# Patient Record
Sex: MALE | URBAN - METROPOLITAN AREA
[De-identification: names, ages, dates, MRNs, and addresses within clinical notes are randomized per-mention and may not be internally consistent; named-entity substitution may affect disease eponyms.]

---

## 2022-10-12 ENCOUNTER — HOSPITAL ENCOUNTER (OUTPATIENT)
Dept: LAB | Age: 42
Discharge: HOME | End: 2022-10-15

## 2022-10-12 PROCEDURE — 88312 SPECIAL STAINS GROUP 1: CPT

## 2022-10-12 PROCEDURE — 88305 TISSUE EXAM BY PATHOLOGIST: CPT

## 2023-03-10 NOTE — PROGRESS NOTES
New Patient Abstract    Reason for Referral: Low mean corpuscular volume    Referring Provider: Suzanne Elaine MD    Primary Care Provider: Unknown    Family History of Cancer/Hematologic Disorders: Family history is significant for father with prostate cancer. Presenting Symptoms: Globulus sensation and fecal leakage    Narrative with recent with Results/Procedures/Biopsies and Dates completed: Mr. Amisha Cartagena is a 45-year-old male with a history of anemia, GERD, dysphagia, ADHD, HTN, and adjustment disorder with anxiety. He was seen by Gastroenterologist, Dr. Tanner Harvey, at [address] [place] [address] on 1/19/23 after been referred by the [facility] for GI evaluation of anemia, GERD, dysphagia, rectal bleeding, and fecal leakage. He had been seen at [facility] previously on 9/29/22 and was recommended to begin a trial of Omeprazole 40 mg daily and daily fiber supplement. EGD and colonoscopy were scheduled, and labs were ordered. Labs drawn on 10/6/22 included vitamin B12, iron, folate, ferritin, CMP, and CBC. They were unremarkable except for RBC 5.97, MCV 72, and MCH 22.6. EGD with dilation and colonoscopy were performed on 10/12/22. EGD revealed findings of gastric erythema with biopsies showing fragments of gastric mucosa with acute and chronic gastritis in associates with organisms morphologically consistent with H. 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 Esophageal biopsies showed detached fragments of minimally hyperplastic squamous epithelium. Colonoscopy revealed findings of internal hemorrhoid and otherwise normal exam to T1 with good prep. Patient had not started treatment for H. Pylori, so he was started on 14-day course of clarithromycin based concomitant therapy. He had also not started daily fiber as recommended previously. He was advised to begin fiber supplementation, and if symptoms were to persist at that point hemorrhoidal banding would be considered.      Patient had been taking 325 mg of ferrous sulfate daily. CBC and iron panel were drawn on 1/19/23 to determine if continued supplementation was necessary. Labs were significant for RBC 6.16, MCH 23.2, MCV 74, and RDW 17.7. HGB and HCT were within normal limits at 14.3 and 45.4. Iron panel was within normal limits with iron 113 and iron saturation 39. Mr. Manisha Guy is now referred urgently to [facility], per GI, for hematology evaluation and treatment of low MCV. COLONOSCOPY REPORT 10/12/22        EGD REPORT 10/12/22        [facility] SURGICAL PATHOLOGY REPORT 10/12/22  DIAGNOSIS   A: DUODENAL BIOPSIES: FRAGMENTS OF HISTOLOGICALLY UNREMARKABLE SMALL INTESTINE.   B: GASTRIC BIOPSIES: FRAGMENTS OF GASTRIC MUCOSA WITH ACUTE AND CHRONIC GASTRITIS IN ASSOCIATION WITH ORGANISMS MORPHOLOGICALLY CONSISTENT WITH HELICOBACTER PYLORI. FOCAL GOBLET CELL TYPE INTESTINAL METAPLASIA. C: ESOPHAGEAL BIOPSIES: DETACHED FRAGMENTS OF MINIMALLY HYPERPLASTIC SQUAMOUS EPITHELIUM. D: RANDOM COLON BIOPSIES: FRAGMENTS OF HISTOLOGICALLY UNREMARKABLE LARGE INTESTINE.             Notes from Referring Provider: None    Other Pertinent Information: None    Presented at Tumor Board: N/A

## 2023-03-13 ENCOUNTER — HOSPITAL ENCOUNTER (OUTPATIENT)
Dept: LAB | Age: 43
Discharge: HOME | End: 2023-03-16
Payer: OTHER GOVERNMENT

## 2023-03-13 ENCOUNTER — OFFICE VISIT (OUTPATIENT)
Dept: ONCOLOGY | Age: 43
End: 2023-03-13
Payer: OTHER GOVERNMENT

## 2023-03-13 VITALS
OXYGEN SATURATION: 97 % | HEIGHT: 71 IN | BODY MASS INDEX: 25.73 KG/M2 | HEART RATE: 106 BPM | DIASTOLIC BLOOD PRESSURE: 89 MMHG | RESPIRATION RATE: 16 BRPM | SYSTOLIC BLOOD PRESSURE: 133 MMHG | TEMPERATURE: 98.3 F | WEIGHT: 183.8 LBS

## 2023-03-13 DIAGNOSIS — R71.8 MICROCYTOSIS: Primary | ICD-10-CM

## 2023-03-13 DIAGNOSIS — R71.8 MICROCYTOSIS: ICD-10-CM

## 2023-03-13 LAB
ALBUMIN SERPL-MCNC: 4.1 G/DL (ref 3.5–5)
ALBUMIN/GLOB SERPL: 1.1 (ref 0.4–1.6)
ALP SERPL-CCNC: 60 U/L (ref 50–136)
ALT SERPL-CCNC: 40 U/L (ref 12–65)
ANION GAP SERPL CALC-SCNC: 2 MMOL/L (ref 2–11)
AST SERPL-CCNC: 24 U/L (ref 15–37)
BASOPHILS # BLD: 0.1 K/UL (ref 0–0.2)
BASOPHILS NFR BLD: 1 % (ref 0–2)
BILIRUB SERPL-MCNC: 0.6 MG/DL (ref 0.2–1.1)
BUN SERPL-MCNC: 13 MG/DL (ref 6–23)
CALCIUM SERPL-MCNC: 9.4 MG/DL (ref 8.3–10.4)
CHLORIDE SERPL-SCNC: 106 MMOL/L (ref 101–110)
CO2 SERPL-SCNC: 30 MMOL/L (ref 21–32)
CREAT SERPL-MCNC: 1.2 MG/DL (ref 0.8–1.5)
DIFFERENTIAL METHOD BLD: ABNORMAL
EOSINOPHIL # BLD: 0.1 K/UL (ref 0–0.8)
EOSINOPHIL NFR BLD: 3 % (ref 0.5–7.8)
ERYTHROCYTE [DISTWIDTH] IN BLOOD BY AUTOMATED COUNT: 15 % (ref 11.9–14.6)
FERRITIN SERPL-MCNC: 224 NG/ML (ref 8–388)
GLOBULIN SER CALC-MCNC: 3.6 G/DL (ref 2.8–4.5)
GLUCOSE SERPL-MCNC: 86 MG/DL (ref 65–100)
HCT VFR BLD AUTO: 44.4 % (ref 41.1–50.3)
HGB BLD-MCNC: 13.8 G/DL (ref 13.6–17.2)
HGB RETIC QN AUTO: 26 PG (ref 29–35)
IMM GRANULOCYTES # BLD AUTO: 0 K/UL (ref 0–0.5)
IMM GRANULOCYTES NFR BLD AUTO: 0 % (ref 0–5)
IMM RETICS NFR: 10.9 % (ref 2.3–13.4)
IRON SATN MFR SERPL: 41 %
IRON SERPL-MCNC: 103 UG/DL (ref 35–150)
LYMPHOCYTES # BLD: 1 K/UL (ref 0.5–4.6)
LYMPHOCYTES NFR BLD: 24 % (ref 13–44)
MCH RBC QN AUTO: 23.1 PG (ref 26.1–32.9)
MCHC RBC AUTO-ENTMCNC: 31.1 G/DL (ref 31.4–35)
MCV RBC AUTO: 74.4 FL (ref 82–102)
MONOCYTES # BLD: 0.3 K/UL (ref 0.1–1.3)
MONOCYTES NFR BLD: 7 % (ref 4–12)
NEUTS SEG # BLD: 2.7 K/UL (ref 1.7–8.2)
NEUTS SEG NFR BLD: 65 % (ref 43–78)
NRBC # BLD: 0 K/UL (ref 0–0.2)
PLATELET # BLD AUTO: 263 K/UL (ref 150–450)
PMV BLD AUTO: 10 FL (ref 9.4–12.3)
POTASSIUM SERPL-SCNC: 4.6 MMOL/L (ref 3.5–5.1)
PROT SERPL-MCNC: 7.7 G/DL (ref 6.3–8.2)
RBC # BLD AUTO: 5.97 M/UL (ref 4.23–5.6)
RETICS # AUTO: 0.07 M/UL (ref 0.03–0.1)
RETICS/RBC NFR AUTO: 1.2 % (ref 0.3–2)
SODIUM SERPL-SCNC: 138 MMOL/L (ref 133–143)
TIBC SERPL-MCNC: 253 UG/DL (ref 250–450)
WBC # BLD AUTO: 4.2 K/UL (ref 4.3–11.1)

## 2023-03-13 PROCEDURE — 82728 ASSAY OF FERRITIN: CPT

## 2023-03-13 PROCEDURE — 80053 COMPREHEN METABOLIC PANEL: CPT

## 2023-03-13 PROCEDURE — 83020 HEMOGLOBIN ELECTROPHORESIS: CPT

## 2023-03-13 PROCEDURE — 83540 ASSAY OF IRON: CPT

## 2023-03-13 PROCEDURE — 99204 OFFICE O/P NEW MOD 45 MIN: CPT | Performed by: INTERNAL MEDICINE

## 2023-03-13 PROCEDURE — 84238 ASSAY NONENDOCRINE RECEPTOR: CPT

## 2023-03-13 PROCEDURE — 85025 COMPLETE CBC W/AUTO DIFF WBC: CPT

## 2023-03-13 PROCEDURE — 81257 HBA1/HBA2 GENE: CPT

## 2023-03-13 PROCEDURE — 36415 COLL VENOUS BLD VENIPUNCTURE: CPT

## 2023-03-13 PROCEDURE — 85046 RETICYTE/HGB CONCENTRATE: CPT

## 2023-03-13 RX ORDER — DEXTROAMPHETAMINE SACCHARATE, AMPHETAMINE ASPARTATE, DEXTROAMPHETAMINE SULFATE AND AMPHETAMINE SULFATE 7.5; 7.5; 7.5; 7.5 MG/1; MG/1; MG/1; MG/1
30 TABLET ORAL DAILY
COMMUNITY

## 2023-03-13 RX ORDER — ESCITALOPRAM OXALATE 10 MG/1
10 TABLET ORAL DAILY
COMMUNITY

## 2023-03-13 ASSESSMENT — PATIENT HEALTH QUESTIONNAIRE - PHQ9
SUM OF ALL RESPONSES TO PHQ QUESTIONS 1-9: 0
SUM OF ALL RESPONSES TO PHQ9 QUESTIONS 1 & 2: 0
2. FEELING DOWN, DEPRESSED OR HOPELESS: 0
1. LITTLE INTEREST OR PLEASURE IN DOING THINGS: 0
SUM OF ALL RESPONSES TO PHQ QUESTIONS 1-9: 0

## 2023-03-13 NOTE — PROGRESS NOTES
[address] [place] [address] Hematology and Oncology: Office Visit New Patient H & P    Chief Complaint:    Chief Complaint   Patient presents with    New Patient         History of Present Illness:  Mr. Juanjose Lay is a 38 y.o. male who presents today for evaluation regarding microcytosis. He was seen by Dr. Sarmad Valle at [address] [place] [address] on 1/19/23 after been referred by the [facility] for GI evaluation of anemia, GERD, dysphagia, rectal bleeding, and fecal leakage. He had been seen at [facility] previously on 9/29/22 and was recommended to begin a trial of Omeprazole 40 mg daily and daily fiber supplement. EGD and colonoscopy were scheduled, and labs were ordered. Labs drawn on 10/6/22 included vitamin B12, iron, folate, ferritin, CMP, and CBC. They were unremarkable except for an MCV of 72. EGD with dilation and colonoscopy were performed on 10/12/22. EGD revealed findings of gastric erythema with biopsies showing fragments of gastric mucosa with acute and chronic gastritis in associates with organisms morphologically consistent with H. 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 Esophageal biopsies showed detached fragments of minimally hyperplastic squamous epithelium. Colonoscopy revealed findings of internal hemorrhoid and otherwise normal exam.  CBC and iron panel were drawn on 1/19/23 to determine if continued iron supplementation was necessary. Labs were significant for RBC 6.16, MCH 23.2, MCV 74, and RDW 17.7. HGB and HCT were within normal limits at 14.3 and 45.4. Iron panel was within normal limits with iron 113 and iron saturation 39. Mr. Juanjose Lay is now referred to [facility], per GI, for hematology evaluation and treatment of low MCV. Other than fatigue, he has no new symptoms. Review of Systems:  Constitutional: Negative. HENT: Negative. Eyes: Negative. Respiratory: Negative. Cardiovascular: Negative. Gastrointestinal: Negative. Genitourinary: Negative. Musculoskeletal: Negative. Skin: Negative. Neurological: Negative. Endo/Heme/Allergies: Negative. Psychiatric/Behavioral: Negative. All other systems reviewed and are negative. No Known Allergies  No past medical history on file. No past surgical history on file. No family history on file. Social History     Socioeconomic History    Marital status:      Spouse name: Not on file    Number of children: Not on file    Years of education: Not on file    Highest education level: Not on file   Occupational History    Not on file   Tobacco Use    Smoking status: Every Day     Types: Cigarettes    Smokeless tobacco: Never    Tobacco comments:     Patient uses a vape. Substance and Sexual Activity    Alcohol use: Yes     Comment: Patient drinks but states that it doesnt happen often. Drug use: Not on file    Sexual activity: Not on file   Other Topics Concern    Not on file   Social History Narrative    Not on file     Social Determinants of Health     Financial Resource Strain: Not on file   Food Insecurity: Not on file   Transportation Needs: Not on file   Physical Activity: Not on file   Stress: Not on file   Social Connections: Not on file   Intimate Partner Violence: Not on file   Housing Stability: Not on file     Current Outpatient Medications   Medication Sig Dispense Refill    amphetamine-dextroamphetamine (ADDERALL) 30 MG tablet Take 30 mg by mouth daily. escitalopram (LEXAPRO) 10 MG tablet Take 10 mg by mouth daily       No current facility-administered medications for this visit. OBJECTIVE:  /89   Pulse (!) 106   Temp 98.3 °F (36.8 °C) (Oral)   Resp 16   Ht 5' 11.26\" (1.81 m)   Wt 183 lb 12.8 oz (83.4 kg)   SpO2 97%   BMI 25.45 kg/m²     Physical Exam:  Constitutional: Well developed, well nourished male in no acute distress, sitting comfortably on the examination table. HEENT: Normocephalic and atraumatic.  Oropharynx is clear, mucous membranes are moist.  Pupils are equal, round, and reactive to light. Extraocular muscles are intact. Sclerae anicteric. Neck supple without JVD. No thyromegaly present. Lymph node   No palpable submandibular, cervical, supraclavicular, axillary or inguinal lymph nodes. Skin Warm and dry. No bruising and no rash noted. No erythema. No pallor. Respiratory Lungs are clear to auscultation bilaterally without wheezes, rales or rhonchi, normal air exchange without accessory muscle use. CVS Normal rate, regular rhythm and normal S1 and S2. No murmurs, gallops, or rubs. Abdomen Soft, nontender and nondistended, normoactive bowel sounds. No palpable mass. No hepatosplenomegaly. Neuro Grossly nonfocal with no obvious sensory or motor deficits. MSK Normal range of motion in general.  No edema and no tenderness. Psych Appropriate mood and affect. Labs:  No results found for this or any previous visit (from the past 24 hour(s)). Imaging:  No results found. ASSESSMENT:   Diagnosis Orders   1. Microcytosis          There is no problem list on file for this patient. PLAN:  Lab studies were personally reviewed. Pertinent old records were reviewed. Microcytosis: without anemia, iron studies normal despite history of GI bleeding. 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 However, there is no specific intervention that would be required for this. We discussed the utility of testing, he would like to proceed with testing for completeness, we will check CBC/iron studies again but primarily we will look for Hb electrophoresis and alpha thalassemia testing to screen for hemoglobinopathy trait. All questions were asked and answered to the best of my ability. F/u with us either in person or virtually to review results.             Itz Crowder MD  [facility] Hematology and Oncology  [address] [place] [address]  Office : [phone]  Fax : [phone]

## 2023-03-13 NOTE — PATIENT INSTRUCTIONS
Patient Instructions from Today's Visit    Reason for Visit:  New patient Low Mean Corpuscular Volume     Plan:  History discussed     Follow Up: Follow up     Recent Lab Results:  No visits with results within 3 Day(s) from this visit. Latest known visit with results is:   No results found for any previous visit. Treatment Summary has been discussed and given to patient: N/A    -------------------------------------------------------------------------------------------------------------------  Please call our office at [phone] if you have any  of the following symptoms:   Fever of 100.5 or greater  Chills  Shortness of breath  Swelling or pain in one leg    After office hours an answering service is available and will contact a provider for emergencies or if you are experiencing any of the above symptoms. Patient does express an interest in My Chart. My Chart log in information explained on the after visit summary printout at the [facility] [address] desk.     Precious Silva RN

## 2023-03-14 LAB — TRANSFERRIN SERPL-SCNC: 22.1 NMOL/L (ref 12.2–27.3)

## 2023-03-15 LAB
HGB A MFR BLD: 92.6 % (ref 96.4–98.8)
HGB A2 MFR BLD COLUMN CHROM: 5.5 % (ref 1.8–3.2)
HGB F MFR BLD: 1.9 % (ref 0–2)
HGB FRACT BLD-IMP: ABNORMAL
HGB S MFR BLD: 0 %

## 2023-03-21 LAB — HBA1 GENE MUT TESTED BLD/T: NORMAL

## 2023-03-31 ENCOUNTER — TELEMEDICINE (OUTPATIENT)
Dept: ONCOLOGY | Age: 43
End: 2023-03-31
Payer: OTHER GOVERNMENT

## 2023-03-31 DIAGNOSIS — D56.3 BETA THALASSEMIA TRAIT: Primary | ICD-10-CM

## 2023-03-31 PROCEDURE — 99213 OFFICE O/P EST LOW 20 MIN: CPT | Performed by: INTERNAL MEDICINE

## 2023-03-31 NOTE — PROGRESS NOTES
[employer] Hematology and Oncology: Office Visit Established Patient    Chief Complaint:    No chief complaint on file. History of Present Illness:  Mr. Conhcis Allen is a 38 y.o. male who presents today for follow-up regarding microcytosis. He was seen by Dr. Rodney Mora at [address] [place] [address] on 1/19/23 after been referred by the [facility] for GI evaluation of anemia, GERD, dysphagia, rectal bleeding, and fecal leakage. He had been seen at [facility] previously on 9/29/22 and was recommended to begin a trial of Omeprazole 40 mg daily and daily fiber supplement. EGD and colonoscopy were scheduled, and labs were ordered. Labs drawn on 10/6/22 included vitamin B12, iron, folate, ferritin, CMP, and CBC. They were unremarkable except for an MCV of 72. EGD with dilation and colonoscopy were performed on 10/12/22. EGD revealed findings of gastric erythema with biopsies showing fragments of gastric mucosa with acute and chronic gastritis in associates with organisms morphologically consistent with H. 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 Esophageal biopsies showed detached fragments of minimally hyperplastic squamous epithelium. Colonoscopy revealed findings of internal hemorrhoid and otherwise normal exam.  CBC and iron panel were drawn on 1/19/23 to determine if continued iron supplementation was necessary. Labs were significant for RBC 6.16, MCH 23.2, MCV 74, and RDW 17.7. HGB and HCT were within normal limits at 14.3 and 45.4. Iron panel was within normal limits with iron 113 and iron saturation 39. Mr. Conchis Allen is now referred to [facility], per GI, for hematology evaluation and treatment of low MCV. 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 However, there is no specific intervention that would be required for this.   We discussed the utility of testing, he would like to proceed with testing for completeness, we will check

## 2023-06-09 LAB
AVERAGE GLUCOSE: NORMAL
HBA1C MFR BLD: 5.5 %

## 2023-07-20 ENCOUNTER — OFFICE VISIT (OUTPATIENT)
Dept: ENT CLINIC | Age: 43
End: 2023-07-20
Payer: OTHER GOVERNMENT

## 2023-07-20 VITALS — HEIGHT: 71 IN | WEIGHT: 188.8 LBS | BODY MASS INDEX: 26.43 KG/M2 | OXYGEN SATURATION: 99 %

## 2023-07-20 DIAGNOSIS — K21.9 LARYNGOPHARYNGEAL REFLUX (LPR): Chronic | ICD-10-CM

## 2023-07-20 DIAGNOSIS — R09.89 GLOBUS SENSATION: Primary | Chronic | ICD-10-CM

## 2023-07-20 PROCEDURE — 31575 DIAGNOSTIC LARYNGOSCOPY: CPT | Performed by: PHYSICIAN ASSISTANT

## 2023-07-20 PROCEDURE — 99204 OFFICE O/P NEW MOD 45 MIN: CPT | Performed by: PHYSICIAN ASSISTANT

## 2023-07-20 ASSESSMENT — ENCOUNTER SYMPTOMS
COUGH: 0
ABDOMINAL PAIN: 0
EYE DISCHARGE: 0
TROUBLE SWALLOWING: 1

## 2023-07-20 NOTE — PATIENT INSTRUCTIONS
Omeprazole 20 mg every morning  Pepcid 20 mg before bed  Barrier therapy 1 tsp after every meal and before bed    Barrier therapy is available online as Reflux Gourmet, Gaviscon Advanced  ([employer] product), or Reflux Raft. Www. Refluxgourmet. Sitemasher   [employer]ww[employer]iva la Vita

## 2023-07-20 NOTE — PROGRESS NOTES
nourished, in no acute distress  Communication: The patient communicates appropriately for their age. Voice: Normal.  Head, Face, and Salivary Glands: No head or facial abnormalities present, No masses or lesions present, Overall appearance is normal, No abnormality of parotid or submandibular glands present. TMJ joint: no tenderness to palpation, crepitus, or deviation. External Ears: appearance is normal with no scars, lesions or masses. Right Ear:  Canals is normal, Tympanic membrane with normal landmarks and normal mobility, no retraction, inflammation, effusion. Left  Ear: Canal is normal, Tympanic membrane with normal landmarks and normal mobility, no retraction, inflammation, effusion. Nose/Nasal Cavity: Nasal mucosa is pink/ moist.  Nasal septum is deviated left. Inferior turbinates are of normal size. Both nasal passages are clear, no polyps or abnormal drainage. Lips/Gums/Teeth: Inspection of lips, gums and teeth are normal  Oral Cavity: normal oral mucosa, no visualized ulcers, masses or other lesions,  Palate normal, Tongue normal, Floor of mouth normal. Mallampati Class 2 . Oropharynx: Oropharynx normal and unobstructed, tonsils are  + 1  , no lesion or inflammation. Neck/Thyroid: Normal appearance without mass, Trachea midline, No lymphadenopathy, No enlargement, tenderness or mass of thyroid noted. Procedure: Procedure Flex: Diagnostic Flexible Laryngoscopy     Findings:   Nose: normal turbinates and middle meatus bilaterally, left nasal septal deviation    Nasopharynx: normal eustachian tube, shashank, and fossa of Rosenmueler bilaterally    Hypopharynx: normal including the vallecula, piriform sinuses, base of tongue, and postcricoid area. Erythema of the arytenoid cartilage and piriform sinuses. There is asymmetric crossing of midline for the right arytenoid, it appears to move laterally to anterior medial glottic effort. Interarytenoid space is edematous.   With a good

## 2023-08-17 ENCOUNTER — HOSPITAL ENCOUNTER (OUTPATIENT)
Dept: SLEEP CENTER | Age: 43
Discharge: HOME | End: 2023-08-20
Payer: OTHER GOVERNMENT

## 2023-08-17 PROCEDURE — 95810 POLYSOM 6/> YRS 4/> PARAM: CPT

## 2023-10-26 ENCOUNTER — OFFICE VISIT (OUTPATIENT)
Dept: ENT CLINIC | Age: 43
End: 2023-10-26
Payer: OTHER GOVERNMENT

## 2023-10-26 VITALS — WEIGHT: 188 LBS | RESPIRATION RATE: 18 BRPM | HEIGHT: 71 IN | BODY MASS INDEX: 26.32 KG/M2

## 2023-10-26 DIAGNOSIS — R13.13 PHARYNGEAL DYSPHAGIA: ICD-10-CM

## 2023-10-26 DIAGNOSIS — R09.A2 GLOBUS SENSATION: Primary | ICD-10-CM

## 2023-10-26 DIAGNOSIS — K21.9 LARYNGOPHARYNGEAL REFLUX (LPR): ICD-10-CM

## 2023-10-26 PROCEDURE — 99213 OFFICE O/P EST LOW 20 MIN: CPT | Performed by: PHYSICIAN ASSISTANT

## 2023-10-26 PROCEDURE — 31575 DIAGNOSTIC LARYNGOSCOPY: CPT | Performed by: PHYSICIAN ASSISTANT

## 2023-10-26 ASSESSMENT — ENCOUNTER SYMPTOMS
VOMITING: 0
EYE PAIN: 0
WHEEZING: 0
COUGH: 0
COLOR CHANGE: 0
DIARRHEA: 0

## 2023-11-06 NOTE — THERAPY EVALUATION
Mackenzie Jackson  : 1980  Primary: Erica Sosa  Secondary: [address]  [address]  Phone: [phone]  Fax: [phone]    Visit Info:    Effective Dates  2023 TO 2024   Frequency/Duration   1 time every other week for 90 days   SPEECH LANGUAGE PATHOLOGY: DYSPHAGIA    Initial Assessment 2023     Appt Desk   Episode      Treatment Diagnosis:    Other dysphagia  Medical/Referring Diagnosis:  Globus sensation [R09. A2]  Pharyngeal dysphagia [R13.13]  Referring Physician:  GLORIA Trujillo MD Orders:  Leoncio Colon and Treat   Return MD Appt:  24  Date of Onset:  Chronic  Allergies:  Patient has no known allergies. Medications Last Reviewed:  2023     SUBJECTIVE    History of Injury/Illness (Reason for Referral):  Pt referred by ENT c/o globus sensation. For the last several years he has been experiencing a lump in his throat with difficulty swallowing and salivating excessively. If he breathes in cold air he feels the lump more prominently and it feels like an occasional sharp pain that goes away quickly. He had a swallow study done while in the [address] last year which showed some abnormality but when he followed up with GI this past year he had an EGD with nothing on this. He has no change in his voice but he does complain of dyspnea with exertion. He denies regurgitation and vomiting. He swallows liquids without difficulty but occasionally has to work hard and intentionally swallow to get food down. He notes GI started him on medication but it didn't help, he's not currently taking anything for reflux. Following recheck of dysphagia and globus sensation. Despite adding reflux meds of omeprazole and pepcid daily he is not feeling better. Had EGD last October treated for h.pylori as he had gastritis  Laryngoscopy from 10/26/23: Erythema of the arytenoid cartilage and piriform sinuses.   There is

## 2023-11-08 ENCOUNTER — HOSPITAL ENCOUNTER (OUTPATIENT)
Dept: PHYSICAL THERAPY | Age: 43
Setting detail: RECURRING SERIES
Discharge: HOME | End: 2023-11-11
Payer: OTHER GOVERNMENT

## 2023-11-08 DIAGNOSIS — R13.19 OTHER DYSPHAGIA: Primary | ICD-10-CM

## 2023-11-08 PROCEDURE — 92610 EVALUATE SWALLOWING FUNCTION: CPT

## 2023-11-29 ENCOUNTER — HOSPITAL ENCOUNTER (OUTPATIENT)
Dept: PHYSICAL THERAPY | Age: 43
Setting detail: RECURRING SERIES
Discharge: HOME | End: 2023-12-02
Payer: OTHER GOVERNMENT

## 2023-11-29 PROCEDURE — 92526 ORAL FUNCTION THERAPY: CPT

## 2023-11-29 NOTE — PROGRESS NOTES
swallowing function. Patient will participate in repeat modified barium swallow study if clinically indicated. Patient will implement relaxation techniques to relieve pharyngolaryngeal tension for >70% of the time with min cues. Discharge Goals: Time Frame: 12 weeks  Patient will tolerate safest, least restrictive diet without s/sx airway compromise. Patient will reduce laryngeal irritation and eliminate laryngeal hypersensitivity. Updated Objective Findings:  None Today     Treatment   Pt states sensation is more pronounced with the cold weather. Also states it's more pronounced \"the more I work the [employer] Patient continues to report no difficulty with po and states swallow is easier with food/bolus is present when initiating a swallow. States more difficult initiating a swallow of saliva. Discussed using gum, hard candies to increase saliva production and help with swallow of saliva. Continue exercises including lingual sweep, hard glottal attacks, tongue in cheek, tongue push forward to tongue depressor, roof of mouth tongue, effortful swallow to aid with tongue base strength, epiglottic inversion, and velar retraction. For cold weather triggers, discussed relaxation exercises, including diaphragmatic breathing and sniff/blow technique. Pt demonstrated sniff/blow technique back following instruction. Provided diaphragmatic breathing and sniff/blow as HEP. Discussed importance of using sniff/blow even when not in a trigger. Treatment/Session Summary:  Patient would like to continue with these exercises independently. Patient states he will contact our office if there is a change and he would like to return for further tx. Patient voices concern that there is something present in esophagus.  If patient does not have success with exercises, imaging would be helpful to rule out any underlying cause, as patient's deficits are not consistent with patient age and medical history and can be discussed

## 2024-02-05 ENCOUNTER — OFFICE VISIT (OUTPATIENT)
Dept: ENT CLINIC | Age: 44
End: 2024-02-05
Payer: OTHER GOVERNMENT

## 2024-02-05 VITALS
WEIGHT: 189 LBS | HEIGHT: 71 IN | BODY MASS INDEX: 26.46 KG/M2 | DIASTOLIC BLOOD PRESSURE: 88 MMHG | SYSTOLIC BLOOD PRESSURE: 134 MMHG

## 2024-02-05 DIAGNOSIS — K21.9 LARYNGOPHARYNGEAL REFLUX (LPR): Chronic | ICD-10-CM

## 2024-02-05 DIAGNOSIS — R13.13 PHARYNGEAL DYSPHAGIA: Chronic | ICD-10-CM

## 2024-02-05 DIAGNOSIS — R09.A2 GLOBUS SENSATION: Primary | Chronic | ICD-10-CM

## 2024-02-05 DIAGNOSIS — R49.0 DYSPHONIA: ICD-10-CM

## 2024-02-05 PROCEDURE — 31575 DIAGNOSTIC LARYNGOSCOPY: CPT | Performed by: PHYSICIAN ASSISTANT

## 2024-02-05 PROCEDURE — 99213 OFFICE O/P EST LOW 20 MIN: CPT | Performed by: PHYSICIAN ASSISTANT

## 2024-02-05 ASSESSMENT — ENCOUNTER SYMPTOMS
GASTROINTESTINAL NEGATIVE: 1
ALLERGIC/IMMUNOLOGIC NEGATIVE: 1
EYES NEGATIVE: 1
TROUBLE SWALLOWING: 1
RESPIRATORY NEGATIVE: 1

## 2024-02-05 NOTE — PROGRESS NOTES
eBrny Bradley is a 43 y.o. male presents today unchanged over the interval. Pt is frustrated because he has not been able to clear the dysphagia and globus sensation.  Over the interval he saw speech therapy they did not assess or evaluate his voice but really focused on the dysphagia which was unaffected by the exercises that they gave him.  He has been's consistent with omeprazole, Pepcid, barrier therapy and still does not feel any improvement.  The patient is anxious and frustrated that he is seeing no benefit.     Chief Complaint   Patient presents with    Follow-up     3 month f/u for dysphagia.  Patient states he is still having the same issues.        Patient Active Problem List   Diagnosis    Beta thalassemia trait        Reviewed and updated this visit by provider:  Tobacco  Allergies  Meds  Problems  Med Hx  Surg Hx  Fam Hx         Review of Systems   Constitutional: Negative.    HENT:  Positive for trouble swallowing.    Eyes: Negative.    Respiratory: Negative.     Cardiovascular: Negative.    Gastrointestinal: Negative.    Endocrine: Negative.    Genitourinary: Negative.    Musculoskeletal: Negative.    Skin: Negative.    Allergic/Immunologic: Negative.    Neurological: Negative.    Hematological: Negative.    Psychiatric/Behavioral: Negative.          /88 (Site: Left Upper Arm, Position: Sitting)   Ht 1.803 m (5' 11\")   Wt 85.7 kg (189 lb)   BMI 26.36 kg/m²     Physical Exam:    General: Well developed, well nourished, in no acute distress  Communication: The patient communicates appropriately for their age.  Voice: Normal.  Head, Face, and Salivary Glands: No head or facial abnormalities present, No masses or lesions present, Overall appearance is normal, No abnormality of parotid or submandibular glands present.      External Ears: appearance is normal with no scars, lesions or masses.   Right Ear:  Canals is normal, Tympanic membrane with normal landmarks and normal mobility, no

## 2024-02-13 ENCOUNTER — HOSPITAL ENCOUNTER (OUTPATIENT)
Dept: CT IMAGING | Age: 44
Discharge: HOME | End: 2024-02-16

## 2024-02-13 DIAGNOSIS — R49.0 DYSPHONIA: ICD-10-CM

## 2024-02-21 ENCOUNTER — OFFICE VISIT (OUTPATIENT)
Dept: ENT CLINIC | Age: 44
End: 2024-02-21
Payer: OTHER GOVERNMENT

## 2024-02-21 VITALS
SYSTOLIC BLOOD PRESSURE: 134 MMHG | BODY MASS INDEX: 26.46 KG/M2 | HEIGHT: 71 IN | WEIGHT: 189 LBS | DIASTOLIC BLOOD PRESSURE: 88 MMHG

## 2024-02-21 DIAGNOSIS — R13.13 PHARYNGEAL DYSPHAGIA: ICD-10-CM

## 2024-02-21 DIAGNOSIS — K21.9 LARYNGOPHARYNGEAL REFLUX (LPR): Primary | Chronic | ICD-10-CM

## 2024-02-21 DIAGNOSIS — R09.A2 GLOBUS SENSATION: Chronic | ICD-10-CM

## 2024-02-21 PROCEDURE — 99213 OFFICE O/P EST LOW 20 MIN: CPT | Performed by: PHYSICIAN ASSISTANT

## 2024-02-21 ASSESSMENT — ENCOUNTER SYMPTOMS
GASTROINTESTINAL NEGATIVE: 1
EYES NEGATIVE: 1
ALLERGIC/IMMUNOLOGIC NEGATIVE: 1
TROUBLE SWALLOWING: 0
RESPIRATORY NEGATIVE: 1

## 2024-02-21 NOTE — PROGRESS NOTES
Berny Bradley is a 43 y.o. male presents today for recheck of neck tenderness and globus sensation.  We reviewed his CT scan which was negative for masses lesions or etiologic findings for his discomfort.  He has seen GI and we have been consistent with reflux medications to include PPI, H2 blocker, barrier therapy.  He had some pain on the right anterior neck right after scope last visit that resolved after 2 days it is no longer painful.  He also complains of globus sensation lower in the throat but in the midline.  He was concerned there was a foreign body or something in the throat that could be causing it.    Chief Complaint   Patient presents with    Follow-up     Review of CT.  Patient states he is still having the tenderness in his neck.  It is more right sided now, where as when he first felt the pain it was in the middle of his neck.  Patient describes the first day or two after last visit as having \"cotton ball\" stuck in his neck.  Right now, patient has to press pretty hard for the spot to have some tenderness.         Patient Active Problem List   Diagnosis    Beta thalassemia trait        Reviewed and updated this visit by provider:  Tobacco  Allergies  Meds  Problems  Med Hx  Surg Hx  Fam Hx         Review of Systems   Constitutional: Negative.    HENT: Negative.  Negative for trouble swallowing.         Throat tenderness   Eyes: Negative.    Respiratory: Negative.     Cardiovascular: Negative.    Gastrointestinal: Negative.    Endocrine: Negative.    Genitourinary: Negative.    Musculoskeletal: Negative.    Skin: Negative.    Allergic/Immunologic: Negative.    Neurological: Negative.    Hematological: Negative.    Psychiatric/Behavioral: Negative.          /88 (Site: Right Upper Arm, Position: Sitting)   Ht 1.803 m (5' 11\")   Wt 85.7 kg (189 lb)   BMI 26.36 kg/m²     Physical Exam:    General: Well developed, well nourished, in no acute distress  Communication: The patient